# Patient Record
Sex: FEMALE | Race: WHITE | NOT HISPANIC OR LATINO | URBAN - METROPOLITAN AREA
[De-identification: names, ages, dates, MRNs, and addresses within clinical notes are randomized per-mention and may not be internally consistent; named-entity substitution may affect disease eponyms.]

---

## 2023-12-12 ENCOUNTER — HOSPITAL ENCOUNTER (EMERGENCY)
Facility: OTHER | Age: 58
Discharge: HOME | End: 2023-12-12
Attending: EMERGENCY MEDICINE
Payer: COMMERCIAL

## 2023-12-12 VITALS
BODY MASS INDEX: 20.61 KG/M2 | HEIGHT: 62 IN | DIASTOLIC BLOOD PRESSURE: 83 MMHG | WEIGHT: 112 LBS | TEMPERATURE: 98 F | SYSTOLIC BLOOD PRESSURE: 167 MMHG | OXYGEN SATURATION: 99 % | HEART RATE: 68 BPM | RESPIRATION RATE: 18 BRPM

## 2023-12-12 DIAGNOSIS — T78.3XXA ANGIOEDEMA, INITIAL ENCOUNTER: Primary | ICD-10-CM

## 2023-12-12 PROCEDURE — 96375 TX/PRO/DX INJ NEW DRUG ADDON: CPT

## 2023-12-12 PROCEDURE — 63600175 PHARM REV CODE 636 W HCPCS

## 2023-12-12 PROCEDURE — 99284 EMERGENCY DEPT VISIT MOD MDM: CPT | Mod: 25

## 2023-12-12 PROCEDURE — 25000003 PHARM REV CODE 250

## 2023-12-12 PROCEDURE — 96374 THER/PROPH/DIAG INJ IV PUSH: CPT

## 2023-12-12 RX ORDER — DEXAMETHASONE SODIUM PHOSPHATE 4 MG/ML
8 INJECTION, SOLUTION INTRA-ARTICULAR; INTRALESIONAL; INTRAMUSCULAR; INTRAVENOUS; SOFT TISSUE
Status: COMPLETED | OUTPATIENT
Start: 2023-12-12 | End: 2023-12-12

## 2023-12-12 RX ORDER — DIPHENHYDRAMINE HYDROCHLORIDE 50 MG/ML
25 INJECTION INTRAMUSCULAR; INTRAVENOUS
Status: COMPLETED | OUTPATIENT
Start: 2023-12-12 | End: 2023-12-12

## 2023-12-12 RX ORDER — PREDNISONE 20 MG/1
20 TABLET ORAL DAILY
Qty: 5 TABLET | Refills: 0 | Status: SHIPPED | OUTPATIENT
Start: 2023-12-12 | End: 2023-12-17

## 2023-12-12 RX ORDER — FAMOTIDINE 10 MG/ML
20 INJECTION INTRAVENOUS
Status: COMPLETED | OUTPATIENT
Start: 2023-12-12 | End: 2023-12-12

## 2023-12-12 RX ADMIN — DIPHENHYDRAMINE HYDROCHLORIDE 25 MG: 50 INJECTION, SOLUTION INTRAMUSCULAR; INTRAVENOUS at 08:12

## 2023-12-12 RX ADMIN — FAMOTIDINE 20 MG: 10 INJECTION, SOLUTION INTRAVENOUS at 08:12

## 2023-12-12 RX ADMIN — DEXAMETHASONE SODIUM PHOSPHATE 8 MG: 4 INJECTION INTRA-ARTICULAR; INTRALESIONAL; INTRAMUSCULAR; INTRAVENOUS; SOFT TISSUE at 08:12

## 2023-12-13 NOTE — ED PROVIDER NOTES
Encounter Date: 12/12/2023       History     Chief Complaint   Patient presents with    Allergic Reaction     PT with swollen lower lip since 1530. PT has no known allergies and HX of lupus. PT took 25 mg benadryl and 10 mg prednisone orally 2 hours ago.     This is a 58-year-old female who presents to the ED with complaints of angioedema that started approximately 4 hours prior to arrival.  Patient states that she noticed a tingling sensation to her bottom lip earlier this afternoon and when she later looked in the mirror, noticed that her bottom lip was significantly swollen.  She has not had any new foods or medications.  Patient took 10 mg prednisone and 25 mg diphenhydramine prior to arrival.  Patient takes an allergy medicine every other day, multivitamin, and iron pill daily.  No prescription medications.  She was diagnosed with lupus many years ago, however, has not had any flares or issues in almost 30 years. Does not take ACE inhibitors.  No associated shortness of breath or chest pain.    The history is provided by the patient.     Review of patient's allergies indicates:  No Known Allergies  History reviewed. No pertinent past medical history.  History reviewed. No pertinent surgical history.  History reviewed. No pertinent family history.  Social History     Tobacco Use    Smoking status: Never     Passive exposure: Never    Smokeless tobacco: Never   Substance Use Topics    Alcohol use: Yes     Comment: social    Drug use: Never     Review of Systems  As per HPI above  Physical Exam     Initial Vitals   BP Pulse Resp Temp SpO2   12/12/23 2012 12/12/23 2012 12/12/23 2012 12/12/23 2014 12/12/23 2012   (!) 179/89 65 16 97.8 °F (36.6 °C) 99 %      MAP       --                Physical Exam    Constitutional: She appears well-developed and well-nourished.  Non-toxic appearance. She does not appear ill. No distress.   HENT:   Head: Normocephalic and atraumatic.   Angioedema noted to bottom lip.  No swelling  or involvement of tongue, uvula, or posterior oropharynx   Eyes: Conjunctivae are normal.   Neck: Neck supple.   Cardiovascular:  Normal rate and regular rhythm.           Pulmonary/Chest: Effort normal and breath sounds normal. No tachypnea. No respiratory distress. She has no decreased breath sounds. She has no wheezes.   Musculoskeletal:      Cervical back: Neck supple.     Neurological: She is alert and oriented to person, place, and time.   Skin: Skin is warm, dry and intact.   Psychiatric: She has a normal mood and affect. Her speech is normal and behavior is normal.         ED Course   Procedures  Labs Reviewed - No data to display       Imaging Results    None          Medications   diphenhydrAMINE injection 25 mg (25 mg Intravenous Given 12/12/23 2029)   famotidine (PF) injection 20 mg (20 mg Intravenous Given 12/12/23 2028)   dexAMETHasone injection 8 mg (8 mg Intravenous Given 12/12/23 2028)     Medical Decision Making  Urgent evaluation of an afebrile 58-year-old female with angioedema of unknown etiology.  No suspected exposure or triggers from her history.  Possible idiopathic angioedema versus unknown allergen.  No improvement with p.o. Benadryl prior to arrival.  She does not have any chest pain or shortness of breath and there is no evidence of anaphylaxis on physical exam.  Plan to treat with steroids, Benadryl, famotidine.  Will continue to monitor closely.  No evidence of airway involvement at this time.    Differential diagnosis includes but is not limited to:  Angioedema, allergic reaction, anaphylaxis, local trauma    Patient remains afebrile nontoxic-appearing.  She was monitored in the ED for 3 hours with no worsening of symptoms.  Did have significant improvement in angioedema, though still with some swelling of her lower lip.  Plan to discharge home with short course of steroids and continued antihistamine use.  Patient is visiting from [facility].  Will refer patient to  allergy/immunology in her home area.    After taking into careful account the patient's history, physical exam findings, as well as empirical and objective data obtained throughout ED workup, I feel no emergent medical condition has been identified. No further evaluation or admission was felt to be required, and the patient is stable for discharge from the ED. The patient was updated with test results, overall clinical impression, and recommended further plan of care, including discharge instructions as provided and outpatient follow-up for continued evaluation and management as needed. All questions were answered. The patient expressed understanding and agreed with current plan for discharge and follow-up plan of care. Strict ED return precautions were provided, including return/worsening of current symptoms, new symptoms, or any other concerns.      Risk  Prescription drug management.                                      Clinical Impression:  Final diagnoses:  [T78.3XXA] Angioedema, initial encounter (Primary)          ED Disposition Condition    Discharge Stable          ED Prescriptions       Medication Sig Dispense Start Date End Date Auth. Provider    predniSONE (DELTASONE) 20 MG tablet Take 1 tablet (20 mg total) by mouth once daily. for 5 days 5 tablet 12/12/2023 12/17/2023 Tamica Franco PA-C          Follow-up Information       Follow up With Specialties Details Why Contact Info    [facility]  Schedule an appointment as soon as possible for a visit   [address]  [address]  [place], [place], [id number] [phone]             Tamica Franco PA-C  12/12/23 [id number]

## 2023-12-13 NOTE — ED TRIAGE NOTES
Allergic Reaction (PT with swollen lower lip since 1530. PT has no known allergies and HX of lupus. )

## 2023-12-13 NOTE — ED NOTES
PATIENT has no further sign or symptoms of allergic reaction at this time, Patient is aaox4, NAD, ambulatory gait steady, patient had no complains and patient daughter remained at bedside at this time

## 2024-01-02 ENCOUNTER — TELEPHONE (OUTPATIENT)
Dept: EMERGENCY MEDICINE | Facility: OTHER | Age: 59
End: 2024-01-02
Payer: COMMERCIAL

## 2024-01-16 ENCOUNTER — TELEPHONE (OUTPATIENT)
Dept: EMERGENCY MEDICINE | Facility: OTHER | Age: 59
End: 2024-01-16
Payer: COMMERCIAL